# Patient Record
Sex: FEMALE | URBAN - METROPOLITAN AREA
[De-identification: names, ages, dates, MRNs, and addresses within clinical notes are randomized per-mention and may not be internally consistent; named-entity substitution may affect disease eponyms.]

---

## 2020-08-26 ENCOUNTER — APPOINTMENT (RX ONLY)
Dept: URBAN - METROPOLITAN AREA CLINIC 38 | Facility: CLINIC | Age: 25
Setting detail: DERMATOLOGY
End: 2020-08-26

## 2020-08-26 DIAGNOSIS — Q826 OTHER SPECIFIED ANOMALIES OF SKIN: ICD-10-CM

## 2020-08-26 DIAGNOSIS — Q819 OTHER SPECIFIED ANOMALIES OF SKIN: ICD-10-CM

## 2020-08-26 DIAGNOSIS — L70.0 ACNE VULGARIS: ICD-10-CM

## 2020-08-26 DIAGNOSIS — Q828 OTHER SPECIFIED ANOMALIES OF SKIN: ICD-10-CM

## 2020-08-26 DIAGNOSIS — L82.0 INFLAMED SEBORRHEIC KERATOSIS: ICD-10-CM

## 2020-08-26 PROBLEM — L85.8 OTHER SPECIFIED EPIDERMAL THICKENING: Status: ACTIVE | Noted: 2020-08-26

## 2020-08-26 PROCEDURE — ? LIQUID NITROGEN

## 2020-08-26 PROCEDURE — 17110 DESTRUCTION B9 LES UP TO 14: CPT | Mod: 52

## 2020-08-26 PROCEDURE — 99203 OFFICE O/P NEW LOW 30 MIN: CPT | Mod: 25

## 2020-08-26 PROCEDURE — ? COUNSELING

## 2020-08-26 ASSESSMENT — LOCATION ZONE DERM
LOCATION ZONE: FACE
LOCATION ZONE: TRUNK
LOCATION ZONE: TRUNK

## 2020-08-26 ASSESSMENT — LOCATION DETAILED DESCRIPTION DERM
LOCATION DETAILED: LEFT CENTRAL MALAR CHEEK
LOCATION DETAILED: RIGHT INFERIOR MEDIAL MIDBACK
LOCATION DETAILED: SUPERIOR LUMBAR SPINE
LOCATION DETAILED: LEFT BUTTOCK

## 2020-08-26 ASSESSMENT — LOCATION SIMPLE DESCRIPTION DERM
LOCATION SIMPLE: LEFT BUTTOCK
LOCATION SIMPLE: RIGHT LOWER BACK
LOCATION SIMPLE: LOWER BACK
LOCATION SIMPLE: LEFT CHEEK

## 2020-08-26 NOTE — PROCEDURE: LIQUID NITROGEN
Add 52 Modifier (Optional): yes
Render Note In Bullet Format When Appropriate: No
Detail Level: Detailed
Medical Necessity Information: It is in your best interest to select a reason for this procedure from the list below. All of these items fulfill various CMS LCD requirements except the new and changing color options.
Consent: The patient's consent was obtained including but not limited to risks of crusting, scabbing, blistering, scarring, darker or lighter pigmentary change, recurrence, incomplete removal and infection.
Post-Care Instructions: I reviewed with the patient in detail post-care instructions. Patient is to wear sunprotection, and avoid picking at any of the treated lesions. Pt may apply Vaseline to crusted or scabbing areas.
Medical Necessity Clause: This procedure was medically necessary because the lesions that were treated were:

## 2020-08-26 NOTE — PROCEDURE: COUNSELING
Detail Level: Detailed
Detail Level: Simple
Sarecycline Counseling: Patient advised regarding possible photosensitivity and discoloration of the teeth, skin, lips, tongue and gums.  Patient instructed to avoid sunlight, if possible.  When exposed to sunlight, patients should wear protective clothing, sunglasses, and sunscreen.  The patient was instructed to call the office immediately if the following severe adverse effects occur:  hearing changes, easy bruising/bleeding, severe headache, or vision changes.  The patient verbalized understanding of the proper use and possible adverse effects of sarecycline.  All of the patient's questions and concerns were addressed.
Topical Clindamycin Pregnancy And Lactation Text: This medication is Pregnancy Category B and is considered safe during pregnancy. It is unknown if it is excreted in breast milk.
Birth Control Pills Pregnancy And Lactation Text: This medication should be avoided if pregnant and for the first 30 days post-partum.
Doxycycline Pregnancy And Lactation Text: This medication is Pregnancy Category D and not consider safe during pregnancy. It is also excreted in breast milk but is considered safe for shorter treatment courses.
Tetracycline Counseling: Patient counseled regarding possible photosensitivity and increased risk for sunburn.  Patient instructed to avoid sunlight, if possible.  When exposed to sunlight, patients should wear protective clothing, sunglasses, and sunscreen.  The patient was instructed to call the office immediately if the following severe adverse effects occur:  hearing changes, easy bruising/bleeding, severe headache, or vision changes.  The patient verbalized understanding of the proper use and possible adverse effects of tetracycline.  All of the patient's questions and concerns were addressed. Patient understands to avoid pregnancy while on therapy due to potential birth defects.
Erythromycin Counseling:  I discussed with the patient the risks of erythromycin including but not limited to GI upset, allergic reaction, drug rash, diarrhea, increase in liver enzymes, and yeast infections.
Tetracycline Pregnancy And Lactation Text: This medication is Pregnancy Category D and not consider safe during pregnancy. It is also excreted in breast milk.
Topical Retinoid Pregnancy And Lactation Text: This medication is Pregnancy Category C. It is unknown if this medication is excreted in breast milk.
High Dose Vitamin A Counseling: Side effects reviewed, pt to contact office should one occur.
Azithromycin Pregnancy And Lactation Text: This medication is considered safe during pregnancy and is also secreted in breast milk.
High Dose Vitamin A Pregnancy And Lactation Text: High dose vitamin A therapy is contraindicated during pregnancy and breast feeding.
Tazorac Counseling:  Patient advised that medication is irritating and drying.  Patient may need to apply sparingly and wash off after an hour before eventually leaving it on overnight.  The patient verbalized understanding of the proper use and possible adverse effects of tazorac.  All of the patient's questions and concerns were addressed.
Topical Sulfur Applications Counseling: Topical Sulfur Counseling: Patient counseled that this medication may cause skin irritation or allergic reactions.  In the event of skin irritation, the patient was advised to reduce the amount of the drug applied or use it less frequently.   The patient verbalized understanding of the proper use and possible adverse effects of topical sulfur application.  All of the patient's questions and concerns were addressed.
Dapsone Counseling: I discussed with the patient the risks of dapsone including but not limited to hemolytic anemia, agranulocytosis, rashes, methemoglobinemia, kidney failure, peripheral neuropathy, headaches, GI upset, and liver toxicity.  Patients who start dapsone require monitoring including baseline LFTs and weekly CBCs for the first month, then every month thereafter.  The patient verbalized understanding of the proper use and possible adverse effects of dapsone.  All of the patient's questions and concerns were addressed.
Erythromycin Pregnancy And Lactation Text: This medication is Pregnancy Category B and is considered safe during pregnancy. It is also excreted in breast milk.
Topical Sulfur Applications Pregnancy And Lactation Text: This medication is Pregnancy Category C and has an unknown safety profile during pregnancy. It is unknown if this topical medication is excreted in breast milk.
Bactrim Counseling:  I discussed with the patient the risks of sulfa antibiotics including but not limited to GI upset, allergic reaction, drug rash, diarrhea, dizziness, photosensitivity, and yeast infections.  Rarely, more serious reactions can occur including but not limited to aplastic anemia, agranulocytosis, methemoglobinemia, blood dyscrasias, liver or kidney failure, lung infiltrates or desquamative/blistering drug rashes.
Benzoyl Peroxide Counseling: Patient counseled that medicine may cause skin irritation and bleach clothing.  In the event of skin irritation, the patient was advised to reduce the amount of the drug applied or use it less frequently.   The patient verbalized understanding of the proper use and possible adverse effects of benzoyl peroxide.  All of the patient's questions and concerns were addressed.
Bactrim Pregnancy And Lactation Text: This medication is Pregnancy Category D and is known to cause fetal risk.  It is also excreted in breast milk.
Minocycline Counseling: Patient advised regarding possible photosensitivity and discoloration of the teeth, skin, lips, tongue and gums.  Patient instructed to avoid sunlight, if possible.  When exposed to sunlight, patients should wear protective clothing, sunglasses, and sunscreen.  The patient was instructed to call the office immediately if the following severe adverse effects occur:  hearing changes, easy bruising/bleeding, severe headache, or vision changes.  The patient verbalized understanding of the proper use and possible adverse effects of minocycline.  All of the patient's questions and concerns were addressed.
Include Pregnancy/Lactation Warning?: No
Dapsone Pregnancy And Lactation Text: This medication is Pregnancy Category C and is not considered safe during pregnancy or breast feeding.
Spironolactone Counseling: Patient advised regarding risks of diarrhea, abdominal pain, hyperkalemia, birth defects (for female patients), liver toxicity and renal toxicity. The patient may need blood work to monitor liver and kidney function and potassium levels while on therapy. The patient verbalized understanding of the proper use and possible adverse effects of spironolactone.  All of the patient's questions and concerns were addressed.
Doxycycline Counseling:  Patient counseled regarding possible photosensitivity and increased risk for sunburn.  Patient instructed to avoid sunlight, if possible.  When exposed to sunlight, patients should wear protective clothing, sunglasses, and sunscreen.  The patient was instructed to call the office immediately if the following severe adverse effects occur:  hearing changes, easy bruising/bleeding, severe headache, or vision changes.  The patient verbalized understanding of the proper use and possible adverse effects of doxycycline.  All of the patient's questions and concerns were addressed.
Benzoyl Peroxide Pregnancy And Lactation Text: This medication is Pregnancy Category C. It is unknown if benzoyl peroxide is excreted in breast milk.
Isotretinoin Counseling: Patient should get monthly blood tests, not donate blood, not drive at night if vision affected, not share medication, and not undergo elective surgery for 6 months after tx completed. Side effects reviewed, pt to contact office should one occur.
Tazorac Pregnancy And Lactation Text: This medication is not safe during pregnancy. It is unknown if this medication is excreted in breast milk.
Topical Clindamycin Counseling: Patient counseled that this medication may cause skin irritation or allergic reactions.  In the event of skin irritation, the patient was advised to reduce the amount of the drug applied or use it less frequently.   The patient verbalized understanding of the proper use and possible adverse effects of clindamycin.  All of the patient's questions and concerns were addressed.
Azithromycin Counseling:  I discussed with the patient the risks of azithromycin including but not limited to GI upset, allergic reaction, drug rash, diarrhea, and yeast infections.
Spironolactone Pregnancy And Lactation Text: This medication can cause feminization of the male fetus and should be avoided during pregnancy. The active metabolite is also found in breast milk.
Birth Control Pills Counseling: Birth Control Pill Counseling: I discussed with the patient the potential side effects of OCPs including but not limited to increased risk of stroke, heart attack, thrombophlebitis, deep venous thrombosis, hepatic adenomas, breast changes, GI upset, headaches, and depression.  The patient verbalized understanding of the proper use and possible adverse effects of OCPs. All of the patient's questions and concerns were addressed.
Isotretinoin Pregnancy And Lactation Text: This medication is Pregnancy Category X and is considered extremely dangerous during pregnancy. It is unknown if it is excreted in breast milk.
Topical Retinoid counseling:  Patient advised to apply a pea-sized amount only at bedtime and wait 30 minutes after washing their face before applying.  If too drying, patient may add a non-comedogenic moisturizer. The patient verbalized understanding of the proper use and possible adverse effects of retinoids.  All of the patient's questions and concerns were addressed.
Detail Level: Zone

## 2020-10-26 ENCOUNTER — EH NON-PROVIDER (OUTPATIENT)
Dept: OCCUPATIONAL MEDICINE | Facility: CLINIC | Age: 25
End: 2020-10-26

## 2020-10-26 ENCOUNTER — EMPLOYEE HEALTH (OUTPATIENT)
Dept: OCCUPATIONAL MEDICINE | Facility: CLINIC | Age: 25
End: 2020-10-26

## 2020-10-26 ENCOUNTER — HOSPITAL ENCOUNTER (OUTPATIENT)
Facility: MEDICAL CENTER | Age: 25
End: 2020-10-26
Attending: PREVENTIVE MEDICINE
Payer: COMMERCIAL

## 2020-10-26 VITALS
HEART RATE: 93 BPM | HEIGHT: 69 IN | RESPIRATION RATE: 16 BRPM | DIASTOLIC BLOOD PRESSURE: 64 MMHG | SYSTOLIC BLOOD PRESSURE: 110 MMHG | WEIGHT: 139 LBS | TEMPERATURE: 98.6 F | OXYGEN SATURATION: 98 % | BODY MASS INDEX: 20.59 KG/M2

## 2020-10-26 DIAGNOSIS — Z02.1 PRE-EMPLOYMENT HEALTH SCREENING EXAMINATION: ICD-10-CM

## 2020-10-26 DIAGNOSIS — Z02.1 PRE-EMPLOYMENT DRUG SCREENING: Primary | ICD-10-CM

## 2020-10-26 DIAGNOSIS — Z02.1 PRE-EMPLOYMENT DRUG SCREENING: ICD-10-CM

## 2020-10-26 LAB
AMP AMPHETAMINE: NORMAL
BAR BARBITURATES: NORMAL
BZO BENZODIAZEPINES: NORMAL
COC COCAINE: NORMAL
INT CON NEG: NORMAL
INT CON POS: NORMAL
MDMA ECSTASY: NORMAL
MET METHAMPHETAMINES: NORMAL
MTD METHADONE: NORMAL
OPI OPIATES: NORMAL
OXY OXYCODONE: NORMAL
PCP PHENCYCLIDINE: NORMAL
POC URINE DRUG SCREEN OCDRS: NEGATIVE
THC: NORMAL

## 2020-10-26 PROCEDURE — 90686 IIV4 VACC NO PRSV 0.5 ML IM: CPT | Performed by: NURSE PRACTITIONER

## 2020-10-26 PROCEDURE — 86480 TB TEST CELL IMMUN MEASURE: CPT | Performed by: PREVENTIVE MEDICINE

## 2020-10-26 PROCEDURE — 8915 PR COMPREHENSIVE PHYSICAL: Performed by: PREVENTIVE MEDICINE

## 2020-10-26 PROCEDURE — 80305 DRUG TEST PRSMV DIR OPT OBS: CPT | Performed by: PREVENTIVE MEDICINE

## 2020-10-26 PROCEDURE — 94375 RESPIRATORY FLOW VOLUME LOOP: CPT | Performed by: PREVENTIVE MEDICINE

## 2020-10-26 PROCEDURE — 90715 TDAP VACCINE 7 YRS/> IM: CPT | Performed by: NURSE PRACTITIONER

## 2020-10-26 SDOH — HEALTH STABILITY: MENTAL HEALTH: HOW OFTEN DO YOU HAVE A DRINK CONTAINING ALCOHOL?: MONTHLY OR LESS

## 2020-10-26 ASSESSMENT — VISUAL ACUITY
OS_CC: 20/30
OD_CC: 20/25

## 2020-10-28 LAB
GAMMA INTERFERON BACKGROUND BLD IA-ACNC: 0.03 IU/ML
M TB IFN-G BLD-IMP: NEGATIVE
M TB IFN-G CD4+ BCKGRND COR BLD-ACNC: 0 IU/ML
MITOGEN IGNF BCKGRD COR BLD-ACNC: >10 IU/ML
QFT TB2 - NIL TBQ2: 0 IU/ML

## 2020-11-05 ENCOUNTER — APPOINTMENT (OUTPATIENT)
Dept: OCCUPATIONAL MEDICINE | Facility: CLINIC | Age: 25
End: 2020-11-05

## 2020-12-17 DIAGNOSIS — Z23 NEED FOR VACCINATION: ICD-10-CM

## 2020-12-29 ENCOUNTER — IMMUNIZATION (OUTPATIENT)
Dept: FAMILY PLANNING/WOMEN'S HEALTH CLINIC | Facility: IMMUNIZATION CENTER | Age: 25
End: 2020-12-29
Attending: FAMILY MEDICINE

## 2020-12-29 DIAGNOSIS — Z23 ENCOUNTER FOR VACCINATION: Primary | ICD-10-CM

## 2020-12-29 DIAGNOSIS — Z23 NEED FOR VACCINATION: ICD-10-CM

## 2020-12-29 PROCEDURE — 91301 MODERNA SARS-COV-2 VACCINE: CPT

## 2020-12-29 PROCEDURE — 0011A MODERNA SARS-COV-2 VACCINE: CPT

## 2021-01-25 ENCOUNTER — HOSPITAL ENCOUNTER (OUTPATIENT)
Facility: MEDICAL CENTER | Age: 26
End: 2021-01-25
Attending: OBSTETRICS & GYNECOLOGY
Payer: COMMERCIAL

## 2021-01-25 ENCOUNTER — GYNECOLOGY VISIT (OUTPATIENT)
Dept: OBGYN | Facility: CLINIC | Age: 26
End: 2021-01-25
Payer: COMMERCIAL

## 2021-01-25 VITALS
HEIGHT: 69 IN | DIASTOLIC BLOOD PRESSURE: 75 MMHG | BODY MASS INDEX: 25.83 KG/M2 | SYSTOLIC BLOOD PRESSURE: 107 MMHG | WEIGHT: 174.4 LBS

## 2021-01-25 DIAGNOSIS — T74.21XS SEXUAL ASSAULT OF ADULT, SEQUELA: ICD-10-CM

## 2021-01-25 DIAGNOSIS — Z30.430 ENCOUNTER FOR IUD INSERTION: ICD-10-CM

## 2021-01-25 DIAGNOSIS — Z11.3 SCREENING EXAMINATION FOR STD (SEXUALLY TRANSMITTED DISEASE): ICD-10-CM

## 2021-01-25 DIAGNOSIS — Z01.419 ENCOUNTER FOR ANNUAL ROUTINE GYNECOLOGICAL EXAMINATION: ICD-10-CM

## 2021-01-25 LAB
INT CON NEG: NEGATIVE
INT CON POS: POSITIVE
POC URINE PREGNANCY TEST: NEGATIVE

## 2021-01-25 PROCEDURE — 87491 CHLMYD TRACH DNA AMP PROBE: CPT

## 2021-01-25 PROCEDURE — 88175 CYTOPATH C/V AUTO FLUID REDO: CPT

## 2021-01-25 PROCEDURE — 58300 INSERT INTRAUTERINE DEVICE: CPT | Performed by: OBSTETRICS & GYNECOLOGY

## 2021-01-25 PROCEDURE — 81025 URINE PREGNANCY TEST: CPT | Performed by: OBSTETRICS & GYNECOLOGY

## 2021-01-25 PROCEDURE — 99395 PREV VISIT EST AGE 18-39: CPT | Mod: 25 | Performed by: OBSTETRICS & GYNECOLOGY

## 2021-01-25 PROCEDURE — 87591 N.GONORRHOEAE DNA AMP PROB: CPT

## 2021-01-25 PROCEDURE — 87624 HPV HI-RISK TYP POOLED RSLT: CPT
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
## 2021-01-25 NOTE — PROCEDURES
IUD Insertion    Date/Time: 1/25/2021 3:28 PM  Performed by: Trini Littlejohn D.O.  Authorized by: Trini Littlejohn D.O.     Consent:     Consent obtained:  Verbal and written    Consent given by:  Patient    Procedure risks and benefits discussed: yes      Patient questions answered: yes      Patient agrees, verbalizes understanding, and wants to proceed: yes    Pre-procedure details:     Negative urine pregnancy test: yes    Procedure:     Pelvic exam performed: yes      Sterile speculum placed in vagina: yes      Cervix visualized: yes      Cervix cleaned and prepped in sterile fashion: yes      Tenaculum applied to cervix: yes      Dilation needed: no      Uterus sounded: yes      Uterus sound depth (cm):  7    IUD inserted with no complications: yes      IUD type:  Omayra    Strings trimmed: yes    Post-procedure:     Patient tolerated procedure well: yes      Patient will follow up after next period: yes

## 2021-01-25 NOTE — NON-PROVIDER
Pt here as New Pt for Gyn exam  Good Phone #:[phone]  Pharmacy verified.  Last Pap:2019, abnormal cells result but normal per Pt.  LMP:1/20/2021  Pt states would like to establish care.  Pt states would like to have a well woman exam. Pt states would like to discuss getting IUD if possible today.  UPT negative, done in clinic.

## 2021-01-26 DIAGNOSIS — Z01.419 ENCOUNTER FOR ANNUAL ROUTINE GYNECOLOGICAL EXAMINATION: ICD-10-CM

## 2021-01-27 LAB
C TRACH DNA GENITAL QL NAA+PROBE: NEGATIVE
CYTOLOGY REG CYTOL: NORMAL
HPV HR 12 DNA CVX QL NAA+PROBE: NEGATIVE
HPV16 DNA SPEC QL NAA+PROBE: NEGATIVE
HPV18 DNA SPEC QL NAA+PROBE: NEGATIVE
N GONORRHOEA DNA GENITAL QL NAA+PROBE: NEGATIVE
SPECIMEN SOURCE: NORMAL
SPECIMEN SOURCE: NORMAL

## 2021-01-30 PROCEDURE — 91301 MODERNA SARS-COV-2 VACCINE: CPT

## 2021-01-30 PROCEDURE — 0012A MODERNA SARS-COV-2 VACCINE: CPT

## 2021-01-31 ENCOUNTER — IMMUNIZATION (OUTPATIENT)
Dept: FAMILY PLANNING/WOMEN'S HEALTH CLINIC | Facility: IMMUNIZATION CENTER | Age: 26
End: 2021-01-31
Payer: COMMERCIAL

## 2021-01-31 DIAGNOSIS — Z23 ENCOUNTER FOR VACCINATION: Primary | ICD-10-CM

## 2021-02-25 ENCOUNTER — GYNECOLOGY VISIT (OUTPATIENT)
Dept: OBGYN | Facility: CLINIC | Age: 26
End: 2021-02-25
Payer: COMMERCIAL

## 2021-02-25 VITALS — BODY MASS INDEX: 26.88 KG/M2 | SYSTOLIC BLOOD PRESSURE: 162 MMHG | DIASTOLIC BLOOD PRESSURE: 88 MMHG | WEIGHT: 182 LBS

## 2021-02-25 DIAGNOSIS — T83.32XA INTRAUTERINE CONTRACEPTIVE DEVICE THREADS LOST, INITIAL ENCOUNTER: ICD-10-CM

## 2021-02-25 DIAGNOSIS — Z30.431 IUD CHECK UP: ICD-10-CM

## 2021-02-25 PROCEDURE — 76830 TRANSVAGINAL US NON-OB: CPT | Performed by: OBSTETRICS & GYNECOLOGY

## 2021-02-25 PROCEDURE — 99213 OFFICE O/P EST LOW 20 MIN: CPT | Mod: 25 | Performed by: OBSTETRICS & GYNECOLOGY

## 2021-02-25 NOTE — NON-PROVIDER
Pt is here for IUD string check  Good phone #:[phone]  Pharmacy verified.  Pt states no other complaints for today.

## 2021-02-25 NOTE — PROGRESS NOTES
S: This is a 25 y.o. year old  who is s/p Omayra placement on 21.She has had some spotting and cramping since the insertion, but currently has no complaints. Denies pelvic pain, heavy vaginal bleeding, or abnormal vaginal discharge.     O: BP (!) 162/88 (BP Location: Left arm, Patient Position: Sitting, BP Cuff Size: Adult)   Wt 82.6 kg (182 lb)     GENERAL: Alert, in no apparent distress  PSYCHIATRIC: Appropriate affect, intact insight and judgement.  ABDOMEN: Soft, nontender, nondistended.  No palpable masses.  No rebound or guarding.  No inguinal lymphadenopathy.  No hepatosplenomegaly.  No hernias.    GENITOURINARY:  Normal external genitalia, no lesions.  Normal urethral meatus, no masses or tenderness.  Normal bladder without fullness or masses.  Vagina well estrogenized, no vaginal discharge or lesions.  Cervix without lesions or discharge, nontender. IUD string NOT present at cervical os.   Uterus normal size, shape, and contour, nontender.  Adnexa nontender, no masses.  Normal anus and perineum.    Rectal Exam - not indicated.    TVUS: Performed and interpreted by myself  Hypoechoic linear structure noted within midline of the uterus consistent with correct placement of IUD.  Impression: IUD within uterus    ASSESSMENT: s/p Omayra  IUD Placement, in proper position.  No side effects.    Discussed that even though her strings are not visible, it is still a viable form of contraception.  We discussed rate of failure is approximately 1%.  We discussed signs of IUD falling into the lower uterine segment which would be cramping and bleeding.  She is going to follow-up in 1 year for IUD checkup.  We can perform another ultrasound at that time to make sure it is still in the same spot.  Discussed that when it is time to have the IUD removed we might have to use a different type of clamp to have it removed that might be a little bit more uncomfortable.  Patient expresses understanding.    PLAN: F/U PRN

## 2021-03-13 ENCOUNTER — EH NON-PROVIDER (OUTPATIENT)
Dept: OCCUPATIONAL MEDICINE | Facility: CLINIC | Age: 26
End: 2021-03-13

## 2021-03-13 ENCOUNTER — HOSPITAL ENCOUNTER (OUTPATIENT)
Dept: LAB | Facility: MEDICAL CENTER | Age: 26
End: 2021-03-13
Attending: OBSTETRICS & GYNECOLOGY
Payer: COMMERCIAL

## 2021-03-13 DIAGNOSIS — T74.21XS SEXUAL ASSAULT OF ADULT, SEQUELA: ICD-10-CM

## 2021-03-13 DIAGNOSIS — Z02.89 ENCOUNTER FOR OCCUPATIONAL HEALTH EXAMINATION INVOLVING RESPIRATOR: Primary | ICD-10-CM

## 2021-03-13 DIAGNOSIS — Z11.3 SCREENING EXAMINATION FOR STD (SEXUALLY TRANSMITTED DISEASE): ICD-10-CM

## 2021-03-13 LAB
HAV IGM SERPL QL IA: NORMAL
HBV CORE IGM SER QL: NORMAL
HBV SURFACE AG SER QL: NORMAL
HCV AB SER QL: NORMAL
HIV 1+2 AB+HIV1 P24 AG SERPL QL IA: NORMAL
TREPONEMA PALLIDUM IGG+IGM AB [PRESENCE] IN SERUM OR PLASMA BY IMMUNOASSAY: NORMAL

## 2021-03-13 PROCEDURE — 86780 TREPONEMA PALLIDUM: CPT

## 2021-03-13 PROCEDURE — 36415 COLL VENOUS BLD VENIPUNCTURE: CPT

## 2021-03-13 PROCEDURE — 87389 HIV-1 AG W/HIV-1&-2 AB AG IA: CPT

## 2021-03-13 PROCEDURE — 94375 RESPIRATORY FLOW VOLUME LOOP: CPT | Performed by: PHYSICIAN ASSISTANT

## 2021-03-13 PROCEDURE — 80074 ACUTE HEPATITIS PANEL: CPT

## 2021-03-27 ENCOUNTER — OFFICE VISIT (OUTPATIENT)
Dept: URGENT CARE | Facility: PHYSICIAN GROUP | Age: 26
End: 2021-03-27
Payer: COMMERCIAL

## 2021-03-27 ENCOUNTER — APPOINTMENT (OUTPATIENT)
Dept: RADIOLOGY | Facility: IMAGING CENTER | Age: 26
End: 2021-03-27
Attending: PHYSICIAN ASSISTANT
Payer: COMMERCIAL

## 2021-03-27 VITALS
OXYGEN SATURATION: 97 % | TEMPERATURE: 98.1 F | SYSTOLIC BLOOD PRESSURE: 108 MMHG | WEIGHT: 170 LBS | RESPIRATION RATE: 18 BRPM | HEART RATE: 90 BPM | HEIGHT: 69 IN | BODY MASS INDEX: 25.18 KG/M2 | DIASTOLIC BLOOD PRESSURE: 68 MMHG

## 2021-03-27 DIAGNOSIS — S49.91XA INJURY OF RIGHT SHOULDER, INITIAL ENCOUNTER: ICD-10-CM

## 2021-03-27 DIAGNOSIS — S69.92XA HAND INJURY, LEFT, INITIAL ENCOUNTER: ICD-10-CM

## 2021-03-27 DIAGNOSIS — S62.311A DISPLACED FRACTURE OF BASE OF SECOND METACARPAL BONE, LEFT HAND, INITIAL ENCOUNTER FOR CLOSED FRACTURE: ICD-10-CM
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
## 2021-03-27 PROBLEM — F98.8 ATTENTION DEFICIT DISORDER (ADD) WITHOUT HYPERACTIVITY: Status: ACTIVE | Noted: 2017-10-18

## 2021-03-27 PROBLEM — F41.9 ANXIETY: Status: ACTIVE | Noted: 2017-10-18

## 2021-03-27 PROBLEM — N39.3 SUI (STRESS URINARY INCONTINENCE, FEMALE): Status: ACTIVE | Noted: 2017-06-16

## 2021-03-27 PROBLEM — F32.9 DEPRESSION, UNIPOLAR: Status: ACTIVE | Noted: 2017-10-18

## 2021-03-27 PROCEDURE — 73030 X-RAY EXAM OF SHOULDER: CPT | Mod: TC,RT | Performed by: PHYSICIAN ASSISTANT

## 2021-03-27 PROCEDURE — 99215 OFFICE O/P EST HI 40 MIN: CPT | Performed by: PHYSICIAN ASSISTANT

## 2021-03-27 PROCEDURE — 73130 X-RAY EXAM OF HAND: CPT | Mod: TC,LT | Performed by: PHYSICIAN ASSISTANT

## 2021-03-27 RX ORDER — KETOROLAC TROMETHAMINE 30 MG/ML
30 INJECTION, SOLUTION INTRAMUSCULAR; INTRAVENOUS ONCE
Status: COMPLETED | OUTPATIENT
Start: 2021-03-27 | End: 2021-03-27

## 2021-03-27 RX ADMIN — KETOROLAC TROMETHAMINE 30 MG: 30 INJECTION, SOLUTION INTRAMUSCULAR; INTRAVENOUS at 10:12

## 2021-03-27 ASSESSMENT — ENCOUNTER SYMPTOMS
TREMORS: 0
DIZZINESS: 0
BACK PAIN: 0
FEVER: 0
NECK PAIN: 1
WEAKNESS: 0
MYALGIAS: 1
TINGLING: 0
LOSS OF CONSCIOUSNESS: 0
FOCAL WEAKNESS: 0
CHILLS: 0
HEADACHES: 1

## 2021-03-27 NOTE — PROGRESS NOTES
Subjective:   Nadya Longoria is a 25 y.o. female who presents for Injury (hand injury left, right shoulder. ski injury. happened thursday. )      HPI  25 y.o. female presents to urgent care with new problem to provider of injury to left hand and right shoulder secondary to skiing accident that occurred 3 days ago. She states she got going to fast and crashed in the moguls secondary to poor visibility. She reports mild muscular neck pain, sternum pain that increases with deep breath, and pain with ROM of right shoulder. Denies midline neck or back pain.   No LOC, patient was wearing a helmet. She recalls event, but states she had difficultly recalling events directly after fall. Denies difficulty ambulation and she was able to ski away from the scene. Patient is right hand dominant.   Denies other associated aggravating or alleviating factors.     Review of Systems   Constitutional: Negative for chills, fever and malaise/fatigue.   Musculoskeletal: Positive for myalgias and neck pain. Negative for back pain.        Right shoulder and left hand pain   Neurological: Positive for headaches. Negative for dizziness, tingling, tremors, focal weakness, loss of consciousness and weakness.   All other systems reviewed and are negative.      Patient Active Problem List   Diagnosis   • Anxiety   • Attention deficit disorder (ADD) without hyperactivity   • Depression, unipolar (HCC)   • ISAIAH (stress urinary incontinence, female)     History reviewed. No pertinent surgical history.  Social History     Tobacco Use   • Smoking status: Never Smoker   • Smokeless tobacco: Never Used   Substance Use Topics   • Alcohol use: Yes   • Drug use: Never      History reviewed. No pertinent family history.   (Allergies, Medications, & Tobacco/Substance Use were reconciled by the Medical Assistant and reviewed by myself. The family history is prepopulated)     Objective:     /68   Pulse 90   Temp 36.7 °C (98.1 °F) (Temporal)   Resp 18    "Ht 1.753 m (5' 9\")   Wt 77.1 kg (170 lb)   SpO2 97%   BMI 25.10 kg/m²     Physical Exam  Vitals reviewed.   Constitutional:       Appearance: Normal appearance. She is well-developed.   HENT:      Head: Normocephalic and atraumatic.      Mouth/Throat:      Mouth: Mucous membranes are moist.      Pharynx: Oropharynx is clear.   Eyes:      Conjunctiva/sclera: Conjunctivae normal.   Cardiovascular:      Rate and Rhythm: Normal rate and regular rhythm.   Pulmonary:      Effort: Pulmonary effort is normal. No respiratory distress.   Abdominal:      Palpations: Abdomen is soft.   Musculoskeletal:      Right shoulder: Bony tenderness present. No swelling, deformity or crepitus. Normal range of motion.        Arms:         Hands:       Cervical back: Normal range of motion and neck supple. Muscular tenderness present. No spinous process tenderness.   Skin:     General: Skin is warm and dry.      Capillary Refill: Capillary refill takes less than 2 seconds.      Findings: Bruising present.   Neurological:      General: No focal deficit present.      Mental Status: She is alert and oriented to person, place, and time.   Psychiatric:         Mood and Affect: Mood normal.         Behavior: Behavior normal.         Thought Content: Thought content normal.         Judgment: Judgment normal.         Assessment/Plan:     1. Hand injury, left, initial encounter  DX-HAND 3+ LEFT    DX-SHOULDER 2+ RIGHT    ketorolac (TORADOL) injection 30 mg   2. Injury of right shoulder, initial encounter  DX-SHOULDER 2+ RIGHT    ketorolac (TORADOL) injection 30 mg   3. Fall involving snow ski as cause of accidental injury     4. Displaced fracture of base of second metacarpal bone, left hand, initial encounter for closed fracture       Narrative & Impression        3/27/2021 9:51 AM     HISTORY/REASON FOR EXAM:  Pain/Deformity Following Trauma.     TECHNIQUE/EXAM DESCRIPTION AND NUMBER OF VIEWS:  3 views of the LEFT hand.     COMPARISON: " None     FINDINGS:     BONE MINERALIZATION: Normal.  JOINTS: Preserved. No erosions.  FRACTURE: Acute, mildly displaced fracture of the second metacarpal base. Possible intra-articular extension.  DISLOCATION: None.  SOFT TISSUES: No mass.     IMPRESSION:  Acute, mildly displaced fracture of the second metacarpal base. Possible intra-articular extension.        Narrative & Impression        3/27/2021 9:51 AM     HISTORY/REASON FOR EXAM:  Pain/Deformity Following Trauma.     TECHNIQUE/EXAM DESCRIPTION AND NUMBER OF VIEWS:  3 views of the RIGHT shoulder.     COMPARISON: None     FINDINGS:     BONE MINERALIZATION: Normal.  JOINTS: Preserved. No erosions.  FRACTURE: None.  DISLOCATION: None.  SOFT TISSUES: No mass.     IMPRESSION:     No acute osseous abnormality.       Given 30 mg of Toradol IM in clinic with good pain relief.  She was given an urgent referral to follow-up with hand surgery for further evaluation of mildly displaced fracture of the second metacarpal base. She was placed in jigar type splint to prevent extension of metacarpals. Continue with OTC NSAIDs in 24 hours. She may also alternate tylenol. Ice, rest, and elevate.      Differential diagnosis, natural history, supportive care, and indications for immediate follow-up discussed.    Advised the patient to follow-up with the primary care physician for recheck, reevaluation, and consideration of further management.  Patient verbalized understanding of treatment plan and has no further questions regarding care.     Please note that this dictation was created using voice recognition software. I have made a reasonable attempt to correct obvious errors, but I expect that there are errors of grammar and possibly content that I did not discover before finalizing the note.    This note was electronically signed by Val Ac PA-C

## 2021-03-27 NOTE — LETTER
March 27, 2021         Patient: Nadya Longoria   YOB: 1995   Date of Visit: 3/27/2021           To Whom it May Concern:    Nadya Longoria was seen in my clinic on 3/27/2021. She {Return to school/sport/work:74362}    If you have any questions or concerns, please don't hesitate to call.        Sincerely,           Val Ac P.A.-C.  Electronically Signed

## 2021-09-21 ENCOUNTER — IMMUNIZATION (OUTPATIENT)
Dept: OCCUPATIONAL MEDICINE | Facility: CLINIC | Age: 26
End: 2021-09-21

## 2021-09-21 DIAGNOSIS — Z23 NEED FOR VACCINATION: Primary | ICD-10-CM

## 2021-09-21 PROCEDURE — 90686 IIV4 VACC NO PRSV 0.5 ML IM: CPT | Performed by: PREVENTIVE MEDICINE

## 2021-11-23 ENCOUNTER — HOSPITAL ENCOUNTER (OUTPATIENT)
Facility: MEDICAL CENTER | Age: 26
End: 2021-11-23
Attending: OBSTETRICS & GYNECOLOGY
Payer: COMMERCIAL

## 2021-11-23 ENCOUNTER — GYNECOLOGY VISIT (OUTPATIENT)
Dept: OBGYN | Facility: CLINIC | Age: 26
End: 2021-11-23
Payer: COMMERCIAL

## 2021-11-23 VITALS
DIASTOLIC BLOOD PRESSURE: 62 MMHG | BODY MASS INDEX: 27.55 KG/M2 | HEIGHT: 69 IN | WEIGHT: 186 LBS | SYSTOLIC BLOOD PRESSURE: 115 MMHG

## 2021-11-23 DIAGNOSIS — N89.8 VAGINAL ITCHING: ICD-10-CM

## 2021-11-23 DIAGNOSIS — Z11.3 SCREEN FOR STD (SEXUALLY TRANSMITTED DISEASE): Primary | ICD-10-CM

## 2021-11-23 LAB
AMBIGUOUS DTTM AMBI4: NORMAL
CANDIDA DNA VAG QL PROBE+SIG AMP: NEGATIVE
G VAGINALIS DNA VAG QL PROBE+SIG AMP: NEGATIVE
T VAGINALIS DNA VAG QL PROBE+SIG AMP: NEGATIVE

## 2021-11-23 PROCEDURE — 87591 N.GONORRHOEAE DNA AMP PROB: CPT

## 2021-11-23 PROCEDURE — 87510 GARDNER VAG DNA DIR PROBE: CPT

## 2021-11-23 PROCEDURE — 99213 OFFICE O/P EST LOW 20 MIN: CPT | Performed by: OBSTETRICS & GYNECOLOGY

## 2021-11-23 PROCEDURE — 87491 CHLMYD TRACH DNA AMP PROBE: CPT

## 2021-11-23 PROCEDURE — 87660 TRICHOMONAS VAGIN DIR PROBE: CPT

## 2021-11-23 PROCEDURE — 87480 CANDIDA DNA DIR PROBE: CPT

## 2021-11-23 NOTE — PROGRESS NOTES
"GYN PROBLEM VISIT    CC:  Vaginal Itching (STD testing )       HPI: Patient is a 26 y.o. who complains of intermittent and frequent/recurrent vaginal itching.  She reports odor as well.  She states sometimes it will \"self resolve\" as she made this appt a month ago when itching and then it got better but has started up again the past 3 days.  She denies significant discharge.  She would also like to get STD testing today.  She is sexually active and uses condoms \"sometimes.\"       ROS:   General: denies fever / chills  HEENT: denies sore throat:  CV: denies chest pain:  Repiratory: denies shortness of breath  GI: denies abdominal pain  : denies dysuria:    PFSH:  I personally reviewed the past medical and surgical histories.     Social History     Tobacco Use   • Smoking status: Never Smoker   • Smokeless tobacco: Never Used   Vaping Use   • Vaping Use: Never used   Substance Use Topics   • Alcohol use: Yes   • Drug use: Never       Social History     Substance and Sexual Activity   Sexual Activity Not Currently        ALLERGIES / REACTIONS:  No Known Allergies                        PHYSICAL EXAMINATION:  Vital Signs:   Vitals:    11/23/21 0817   BP: 115/62   BP Location: Right arm   Patient Position: Sitting   BP Cuff Size: Adult   Weight: 84.4 kg (186 lb)   Height: 1.753 m (5' 9\")     Body mass index is 27.47 kg/m².    Gen: appears well, NAD  Respiratory: normal effort  Abdomen: Soft, non-tender.  Pelvic Exam:    Vulva: normal.    Urethra: normal.   Vagina: normal.  No notable discharge or odor. Affirm and GC/CT collected   Cervix: normal. IUD string visualized extending about 2mm from os    ASSESSMENT AND PLAN:  Nadya was seen today for vaginal itching.    Diagnoses and all orders for this visit:    Screen for STD (sexually transmitted disease)  -     T.PALLIDUM AB EIA; Future  -     HIV AG/AB COMBO ASSAY SCREENING; Future  -     HEPATITIS PANEL ACUTE(4 COMPONENTS); Future    Vaginal itching  -     " Chlamydia/GC PCR Urine Or Swab  -     VAGINAL PATHOGENS DNA PANEL        Yanira Jaquez D.O.

## 2021-11-24 LAB
C TRACH DNA SPEC QL NAA+PROBE: NEGATIVE
N GONORRHOEA DNA SPEC QL NAA+PROBE: NEGATIVE
SPECIMEN SOURCE: NORMAL

## 2022-03-10 ENCOUNTER — OCCUPATIONAL MEDICINE (OUTPATIENT)
Dept: OCCUPATIONAL MEDICINE | Facility: CLINIC | Age: 27
End: 2022-03-10
Payer: COMMERCIAL

## 2022-03-10 ENCOUNTER — NON-PROVIDER VISIT (OUTPATIENT)
Dept: OCCUPATIONAL MEDICINE | Facility: CLINIC | Age: 27
End: 2022-03-10
Payer: COMMERCIAL

## 2022-03-10 ENCOUNTER — HOSPITAL ENCOUNTER (EMERGENCY)
Facility: MEDICAL CENTER | Age: 27
End: 2022-03-10
Attending: EMERGENCY MEDICINE
Payer: COMMERCIAL

## 2022-03-10 VITALS
HEIGHT: 69 IN | HEART RATE: 87 BPM | DIASTOLIC BLOOD PRESSURE: 78 MMHG | TEMPERATURE: 97 F | WEIGHT: 180 LBS | OXYGEN SATURATION: 97 % | RESPIRATION RATE: 15 BRPM | SYSTOLIC BLOOD PRESSURE: 125 MMHG | BODY MASS INDEX: 26.66 KG/M2

## 2022-03-10 VITALS
HEIGHT: 69 IN | WEIGHT: 180 LBS | TEMPERATURE: 98.6 F | OXYGEN SATURATION: 98 % | HEART RATE: 80 BPM | SYSTOLIC BLOOD PRESSURE: 104 MMHG | DIASTOLIC BLOOD PRESSURE: 66 MMHG | BODY MASS INDEX: 26.66 KG/M2
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
## 2022-03-10 DIAGNOSIS — Z77.21 EXPOSURE TO BLOOD OR BODY FLUID: Primary | ICD-10-CM

## 2022-03-10 DIAGNOSIS — Z02.89 VISIT FOR OCCUPATIONAL HEALTH EXAMINATION: Primary | ICD-10-CM

## 2022-03-10 DIAGNOSIS — S61.231A PUNCTURE WOUND OF LEFT INDEX FINGER: ICD-10-CM

## 2022-03-10 LAB
ALBUMIN SERPL BCP-MCNC: 4.6 G/DL (ref 3.2–4.9)
ALBUMIN/GLOB SERPL: 1.7 G/DL
ALP SERPL-CCNC: 85 U/L (ref 30–99)
ALT SERPL-CCNC: 16 U/L (ref 2–50)
AMP AMPHETAMINE: NORMAL
ANION GAP SERPL CALC-SCNC: 12 MMOL/L (ref 7–16)
AST SERPL-CCNC: 22 U/L (ref 12–45)
BAR BARBITURATES: NORMAL
BILIRUB SERPL-MCNC: 0.3 MG/DL (ref 0.1–1.5)
BREATH ALCOHOL COMMENT: NORMAL
BUN SERPL-MCNC: 13 MG/DL (ref 8–22)
BZO BENZODIAZEPINES: NORMAL
CALCIUM SERPL-MCNC: 9.4 MG/DL (ref 8.5–10.5)
CHLORIDE SERPL-SCNC: 101 MMOL/L (ref 96–112)
CO2 SERPL-SCNC: 24 MMOL/L (ref 20–33)
COC COCAINE: NORMAL
CREAT SERPL-MCNC: 0.53 MG/DL (ref 0.5–1.4)
ERYTHROCYTE [DISTWIDTH] IN BLOOD BY AUTOMATED COUNT: 42.5 FL (ref 35.9–50)
GLOBULIN SER CALC-MCNC: 2.7 G/DL (ref 1.9–3.5)
GLUCOSE SERPL-MCNC: 85 MG/DL (ref 65–99)
HBV CORE AB SERPL QL IA: NONREACTIVE
HBV SURFACE AB SERPL IA-ACNC: <3.5 MIU/ML (ref 0–10)
HBV SURFACE AG SER QL: ABNORMAL
HCG SERPL QL: NEGATIVE
HCT VFR BLD AUTO: 39 % (ref 37–47)
HCV AB SER QL: ABNORMAL
HGB BLD-MCNC: 12.9 G/DL (ref 12–16)
HIV 1+2 AB+HIV1 P24 AG SERPL QL IA: ABNORMAL
INT CON NEG: NORMAL
INT CON POS: NORMAL
MCH RBC QN AUTO: 30.9 PG (ref 27–33)
MCHC RBC AUTO-ENTMCNC: 33.1 G/DL (ref 33.6–35)
MCV RBC AUTO: 93.5 FL (ref 81.4–97.8)
MDMA ECSTASY: NORMAL
MET METHAMPHETAMINES: NORMAL
MTD METHADONE: NORMAL
OPI OPIATES: NORMAL
OXY OXYCODONE: NORMAL
PCP PHENCYCLIDINE: NORMAL
PLATELET # BLD AUTO: 235 K/UL (ref 164–446)
PMV BLD AUTO: 9.5 FL (ref 9–12.9)
POC BREATHALIZER: 0 PERCENT (ref 0–0.01)
POC URINE DRUG SCREEN OCDRS: NORMAL
POTASSIUM SERPL-SCNC: 3.4 MMOL/L (ref 3.6–5.5)
PROT SERPL-MCNC: 7.3 G/DL (ref 6–8.2)
RBC # BLD AUTO: 4.17 M/UL (ref 4.2–5.4)
SODIUM SERPL-SCNC: 137 MMOL/L (ref 135–145)
THC: NORMAL
WBC # BLD AUTO: 7.6 K/UL (ref 4.8–10.8)

## 2022-03-10 PROCEDURE — 84703 CHORIONIC GONADOTROPIN ASSAY: CPT

## 2022-03-10 PROCEDURE — 86706 HEP B SURFACE ANTIBODY: CPT

## 2022-03-10 PROCEDURE — 80305 DRUG TEST PRSMV DIR OPT OBS: CPT | Performed by: NURSE PRACTITIONER

## 2022-03-10 PROCEDURE — 86803 HEPATITIS C AB TEST: CPT

## 2022-03-10 PROCEDURE — 87389 HIV-1 AG W/HIV-1&-2 AB AG IA: CPT

## 2022-03-10 PROCEDURE — 80053 COMPREHEN METABOLIC PANEL: CPT

## 2022-03-10 PROCEDURE — 90746 HEPB VACCINE 3 DOSE ADULT IM: CPT | Performed by: PREVENTIVE MEDICINE

## 2022-03-10 PROCEDURE — 99202 OFFICE O/P NEW SF 15 MIN: CPT | Mod: 25 | Performed by: PREVENTIVE MEDICINE

## 2022-03-10 PROCEDURE — 99283 EMERGENCY DEPT VISIT LOW MDM: CPT

## 2022-03-10 PROCEDURE — 85027 COMPLETE CBC AUTOMATED: CPT

## 2022-03-10 PROCEDURE — A9270 NON-COVERED ITEM OR SERVICE: HCPCS | Performed by: EMERGENCY MEDICINE

## 2022-03-10 PROCEDURE — 700102 HCHG RX REV CODE 250 W/ 637 OVERRIDE(OP): Performed by: EMERGENCY MEDICINE

## 2022-03-10 PROCEDURE — 86704 HEP B CORE ANTIBODY TOTAL: CPT

## 2022-03-10 PROCEDURE — 87340 HEPATITIS B SURFACE AG IA: CPT

## 2022-03-10 PROCEDURE — 90471 IMMUNIZATION ADMIN: CPT | Performed by: PREVENTIVE MEDICINE

## 2022-03-10 PROCEDURE — 82075 ASSAY OF BREATH ETHANOL: CPT | Performed by: NURSE PRACTITIONER

## 2022-03-10 RX ORDER — EMTRICITABINE AND TENOFOVIR DISOPROXIL FUMARATE 200; 300 MG/1; MG/1
1 TABLET, FILM COATED ORAL ONCE
Status: COMPLETED | OUTPATIENT
Start: 2022-03-10 | End: 2022-03-10

## 2022-03-10 RX ADMIN — EMTRICITABINE AND TENOFOVIR DISOPROXIL FUMARATE 1 TABLET: 200; 300 TABLET, FILM COATED ORAL at 04:00

## 2022-03-10 RX ADMIN — RALTEGRAVIR 400 MG: 400 TABLET, FILM COATED ORAL at 04:01

## 2022-03-10 ASSESSMENT — FIBROSIS 4 INDEX: FIB4 SCORE: 0.56

## 2022-03-10 NOTE — LETTER
But there is an exact   [facility] [place]  [address],   [address] [place] [address]  Phone:  [phone] - Fax:  [phone]   [facility] Progress Report and Disability Certification  Date of Service: 3/10/2022   No Show:  No  Date / Time of Next Visit:  Release from care   Claim Information   Patient Name: Nadya Longoria  Claim Number:     Employer: [employer]  Date of Injury: 3/10/2022     Insurer / TPA: [employer]  ID / SSN:     Occupation: REGISTERED NURSE  Diagnosis: The encounter diagnosis was Exposure to blood or body fluid.    Medical Information   Related to Industrial Injury? Yes    Subjective Complaints:  DOI 3/10/2022: 26-year-old injured worker presents with needlestick injury.  JHONATAN: She was attempting to insert an IV, pulled the catheter needle out and accidentally poked herself in the left index finger.  She was seen in the ER, baseline labs were drawn and HIV medications were started.  Currently asymptomatic.   Objective Findings: Constitutional: Patient is in no acute distress. Appears well-developed and well-nourished.   HENT: Normocephalic and atraumatic. EOM are normal. No scleral icterus.   Cardiovascular: Normal rate.    Pulmonary/Chest: Effort normal. No respiratory distress.   Neurological: Patient is alert and oriented to person, place, and time.   Skin: Skin is warm and dry.   Psychiatric: Normal mood and affect. Behavior is normal.      Pre-Existing Condition(s):     Assessment:   Condition Improved    Status: Discharged /  MMI  Permanent Disability:No    Plan:      Diagnostics:      Comments:  Source status negative for HIV, Hep B and Hep C  Baseline labs within normal limits. Negative for HIV, Hep B and Hep C. Titers indicate patient non-immune to Hepatitis B.  Recommend hepatitis B vaccine series provided first vaccine today.  Given nega  tive source no further follow up or testing recommended  Tetanus is up-to-date  Release  from care  Follow up as needed      Disability Information   Status: Released to Full Duty    From:  3/10/2022  Through:   Restrictions are:     Physical Restrictions   Sitting:    Standing:    Stooping:    Bending:      Squatting:    Walking:    Climbing:    Pushing:      Pulling:    Other:    Reaching Above Shoulder (L):   Reaching Above Shoulder (R):       Reaching Below Shoulder (L):    Reaching Below Shoulder (R):      Not to exceed Weight Limits   Carrying(hrs):   Weight Limit(lb):   Lifting(hrs):   Weight  Limit(lb):     Comments:      Repetitive Actions   Hands: i.e. Fine Manipulations from Grasping:     Feet: i.e. Operating Foot Controls:     Driving / Operate Machinery:     Health Care Provider’s Original or Electronic Signature  Peterson Prieto D.O. Health Care Provider’s Original or Electronic Signature    Ruben Daly MD         Clinic Name / Location: [address],   [address] [address] Clinic Phone Number: Dept: [phone]   Appointment Time: 4:15 Pm Visit Start Time: 4:28 PM   Check-In Time:  4:23 Pm Visit Discharge Time:  4:47pm   Original-Treating Physician or Chiropractor    Page 2-Insurer/TPA    Page 3-Employer    Page 4-Employee

## 2022-03-10 NOTE — ED PROVIDER NOTES
"ED Provider Note    CHIEF COMPLAINT  Chief Complaint   Patient presents with   • Body Fluid Exposure     Pt had needle stick upstairs.         HPI    Primary care provider: Pcp Pt States None   History obtained from: Patient  History limited by: None     Nadya Longoria is a 26 y.o. female who presents to the ED with an accidental needlestick injury shortly prior to arrival.  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  The source patient is currently a patient in the hospital and will have blood obtained.  Patient reports that she is up-to-date with all her immunization shots including hepatitis and tetanus.  She is able to move her finger without difficulty.  She denies possibility of pregnancy.  She would like to start HIV prophylaxis.    REVIEW OF SYSTEMS  Please see HPI for pertinent positives/negatives.     PAST MEDICAL HISTORY  Past Medical History:   Diagnosis Date   • Anxiety 10/18/2017   • Depression, unipolar (HCC) 10/18/2017        SURGICAL HISTORY  History reviewed. No pertinent surgical history.     SOCIAL HISTORY  Social History     Tobacco Use   • Smoking status: Never Smoker   • Smokeless tobacco: Never Used   Vaping Use   • Vaping Use: Never used   Substance and Sexual Activity   • Alcohol use: Yes   • Drug use: Never   • Sexual activity: Not Currently        FAMILY HISTORY  No family history on file.     CURRENT MEDICATIONS  Home Medications     Reviewed by Nadya De La Paz R.N. (Registered Nurse) on 03/10/22 at 0305  Med List Status: <None>   Medication Last Dose Status   Levonorgestrel (MELI IU)  Active                 ALLERGIES  No Known Allergies     PHYSICAL EXAM  VITAL SIGNS: /78   Pulse 87   Temp 36.1 °C (97 °F) (Temporal)   Resp 15   Ht 1.753 m (5' 9\")   Wt 81.6 kg (180 lb)   SpO2 97%   BMI 26.58 kg/m²  @PEMA[[id number]::@     Pulse ox interpretation: 97% I interpret this pulse ox as normal "     Constitutional: Well developed, well nourished, alert in no apparent distress, nontoxic appearance   HENT: No external signs of trauma, normocephalic   Eyes: No discharge, no icterus   Neck: No stridor   Cardiovascular: Strong distal pulses and good perfusion   Thorax & Lungs: No respiratory distress   Extremities: No cyanosis, puncture wound to the volar surface of proximal phalanx of left index finger, no edema, no gross deformity, full ROM, intact distal pulses with brisk cap refill   Skin: Warm, dry, no pallor/cyanosis, no rash noted except as above  Neuro: A/O times 3, no focal deficits noted   Psychiatric: Cooperative      DIAGNOSTIC STUDIES / PROCEDURES        LABS  All labs reviewed by me.     Results for orders placed or performed during the hospital encounter of 03/10/22   EXPOSED PERSON-SOURCE PT POSITIVE OR UNK (BLOOD & BODY FLUID EXPOSURE)   Result Value Ref Range    HIV Ag/Ab Combo Assay Non-Reactive Non Reactive    Hepatitis C Antibody Non-Reactive Non-Reactive    Hepatitis B Surface Antigen Non-Reactive Non-Reactive    Hep B Surface Antibody Quant <3.50 0.00 - 10.00 mIU/mL    Hepatitis B Core Ab, Total NonReactive Non-Reactive    WBC 7.6 4.8 - 10.8 K/uL    RBC 4.17 (L) 4.20 - 5.40 M/uL    Hemoglobin 12.9 12.0 - 16.0 g/dL    Hematocrit 39.0 37.0 - 47.0 %    MCV 93.5 81.4 - 97.8 fL    MCH 30.9 27.0 - 33.0 pg    MCHC 33.1 (L) 33.6 - 35.0 g/dL    RDW 42.5 35.9 - 50.0 fL    Platelet Count 235 164 - 446 K/uL    MPV 9.5 9.0 - 12.9 fL    Sodium 137 135 - 145 mmol/L    Potassium 3.4 (L) 3.6 - 5.5 mmol/L    Chloride 101 96 - 112 mmol/L    Co2 24 20 - 33 mmol/L    Anion Gap 12.0 7.0 - 16.0    Glucose 85 65 - 99 mg/dL    Bun 13 8 - 22 mg/dL    Creatinine 0.53 0.50 - 1.40 mg/dL    Calcium 9.4 8.5 - 10.5 mg/dL    AST(SGOT) 22 12 - 45 U/L    ALT(SGPT) 16 2 - 50 U/L    Alkaline Phosphatase 85 30 - 99 U/L    Total Bilirubin 0.3 0.1 - 1.5 mg/dL    Albumin 4.6 3.2 - 4.9 g/dL    Total Protein 7.3 6.0 - 8.2 g/dL     Globulin 2.7 1.9 - 3.5 g/dL    A-G Ratio 1.7 g/dL   BETA-HCG QUALITATIVE SERUM   Result Value Ref Range    Beta-Hcg Qualitative Serum Negative Negative   ESTIMATED GFR   Result Value Ref Range    GFR If African American >60 >60 mL/min/1.73 m 2    GFR If Non African American >60 >60 mL/min/1.73 m 2        RADIOLOGY  The radiologist's interpretation of all radiological studies have been reviewed by me.     No orders to display          COURSE & MEDICAL DECISION MAKING  Nursing notes, VS, PMSFHx reviewed in chart.     Differential diagnoses considered include but are not limited to: Puncture wound, contusion, potential body flu exposure      History and physical exam as above.  Patient with accidental needlestick injury to the left index finger.  No apparent significant neurovascular or tendon injury.  Patient is up-to-date with her immunizations.  Blood will be obtained from both the patient and the source patient.  Patient would like to start HIV prophylaxis which will be initiated by ED pharmacist.  Patient will follow-up with occupational health.  Patient verbalized understanding and agreed with plan of care with no further questions or concerns.      The patient is referred to a primary physician for blood pressure management, diabetic screening, and for all other preventative health concerns.       FINAL IMPRESSION  1. Needlestick injury accident with exposure to body fluid Acute   2. Puncture wound of left index finger Acute          DISPOSITION  Patient will be discharged home in stable condition.       FOLLOW UP  Mari Hebert  [address] [address]  [phone]    Call today      [facility], Emergency Dept  [address] [id number] [phone]    If symptoms worsen         OUTPATIENT MEDICATIONS  Discharge Medication List as of 3/10/2022  4:02 AM             Electronically signed by: Christ Abernathy D.O., 3/10/2022 3:17 AM      Portions of this record were made with  voice recognition software.  Despite my review, spelling/grammar/context errors may still remain.  Interpretation of this chart should be taken in this context.

## 2022-03-10 NOTE — LETTER
"  FORM C-4:  EMPLOYEE’S CLAIM FOR COMPENSATION/ REPORT OF INITIAL TREATMENT  EMPLOYEE’S CLAIM - PROVIDE ALL INFORMATION REQUESTED   First Name Nadya Last Name Swati Birthdate 1995  Sex female Claim Number   Home Address [address]   [facility] [place]             Zip [address]                                   Age  26 y.o. 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 (if applicable) ATTEMPTING TO INSERT IV    How did this injury or occupational disease occur? Be specific and answer in detail. 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 N/A Witnesses to the Accident  Liz Valente   Nature of Injury or Occupational Disease  Workers' Compensation Part(s) of Body Injured or Affected  Finger (L), N/A, N/A    I CERTIFY THAT THE ABOVE IS TRUE AND CORRECT TO THE BEST OF MY KNOWLEDGE AND THAT I HAVE PROVIDED THIS INFORMATION IN ORDER TO OBTAIN THE " B[place]EF[place] CHAPTER 617 OF [place]).  I HEREBY AUTHORIZE ANY PHYSICIAN, CHIROPRACTOR, SURGEON, PRACTITIONER, OR OTHER PERSON, ANY HOSPITAL, INCLUDING [facility] OR [facility], ANY MEDICAL SERVICE ORGANIZATION, ANY INSURANCE COMPANY, OR OTHER INSTITUTION OR ORGANIZATION TO RELEASE TO EACH OTHER, ANY MEDICAL OR OTHER INFORMATION, INCLUDING BENEFITS PAID OR PAYABLE, PERTINENT TO THIS INJURY OR DISEASE, EXCEPT INFORMATION RELATIVE TO DIAGNOSIS, TREATMENT AND/OR COUNSELING FOR AIDS, PSYCHOLOGICAL CONDITIONS, ALCOHOL OR CONTROLLED SUBSTANCES, FOR WHICH I MUST GIVE SPECIFIC AUTHORIZATION.  A PHOTOSTAT OF THIS AUTHORIZATION SHALL BE AS VALID AS THE ORIGINAL.  Date  03/10/2022  Place  [facility]       Employee’s Signature   THIS REPORT MUST BE COMPLETED AND MAILED WITHIN 3 WORKING DAYS OF TREATMENT   Place [facility], EMERGENCY DEPT                       Name of Facility [facility]   Date  3/10/2022 Diagnosis  (W46.1XXA) Needlestick injury accident with exposure to body fluid, Acute  (S61.231A) Puncture wound of left index finger, Acute Is there evidence the injured employee was under the influence of alcohol and/or another controlled substance at the time of accident?   Hour  3:49 AM Description of Injury or Disease  Needlestick injury accident with exposure to body fluid  Puncture wound of left index finger No   Treatment  Puncture wound to left index finger due to accidental needlestick  Have you advised the patient to remain off work five days or more?         No   X-Ray Findings    If Yes   From Date    To Date      From information given by the employee, together with medical evidence, can you directly connect this injury or occupational disease as job incurred? Yes If No, is employee capable of: Full Duty  Yes Modified Duty      Is additional medical  "care by a physician indicated? Yes  Comments:Please follow-up with occupational health If Modified Duty, Specify any Limitations / Restrictions       Do you know of any previous injury or disease contributing to this condition or occupational disease? No    Date 3/10/2022 Print Doctor’s Name Mey Abernathy certify the employer’s copy of this form was mailed on:   Address [address] [place] [address]  [place] [id number] [phone] INSURER’S USE ONLY   Provider’s Tax ID Number   Telephone Dept: [phone]    Doctor’s Signature e-MEY Negrete D.O. Degree  M.D.      Form C-4 (rev.10/07)                                                                         BRIEF DESCRIPTION OF RIGHTS AND BENEFITS  (Pursuant to [place]RS 616C.050)    Notice of Injury or Occupational Disease (Incident Report Form C-1): If an injury or occupational disease (OD) arises out of and in the course of employment, you must provide written notice to your employer as soon as practicable, but no later than 7 days after the accident or OD. Your employer shall maintain a sufficient supply of the required forms.    Claim for Compensation (Form C-4): If medical treatment is sought, the form C-4 is available at the place of initial treatment. A completed \"Claim for Compensation\" (Form C-4) must be filed within 90 days after an accident or OD. The treating physician or chiropractor must, within 3 working days after treatment, complete and mail to the employer, the employer's insurer and third-party , the Claim for Compensation.    Medical Treatment: If you require medical treatment for your on-the-job injury or OD, you may be required to select a physician or chiropractor from a list provided by your workers’ compensation insurer, if it has contracted with an Organization for Managed Care (MCO) or Preferred Provider Organization (PPO) or providers of health care. If your employer has not entered into a contract with an MCO or PPO, you may select a " physician or chiropractor from the Panel of Physicians and Chiropractors. Any medical costs related to your industrial injury or OD will be paid by your insurer.    Temporary Total Disability (TTD): If your doctor has certified that you are unable to work for a period of at least 5 consecutive days, or 5 cumulative days in a 20-day period, or places restrictions on you that your employer does not accommodate, you may be entitled to TTD compensation.    Temporary Partial Disability (TPD): If the wage you receive upon reemployment is less than the compensation for TTD to which you are entitled, the insurer may be required to pay you TPD compensation to make up the difference. TPD can only be paid for a maximum of 24 months.    Permanent Partial Disability (PPD): When your medical condition is stable and there is an indication of a PPD as a result of your injury or OD, within 30 days, your insurer must arrange for an evaluation by a rating physician or chiropractor to determine the degree of your PPD. The amount of your PPD award depends on the date of injury, the results of the PPD evaluation, your age and wage.    Permanent Total Disability (PTD): If you are medically certified by a treating physician or chiropractor as permanently and totally disabled and have been granted a PTD status by your insurer, you are entitled to receive monthly benefits not to exceed 66 2/3% of your average monthly wage. The amount of your PTD payments is subject to reduction if you previously received a lump-sum PPD award.    Vocational Rehabilitation Services: You may be eligible for vocational rehabilitation services if you are unable to return to the job due to a permanent physical impairment or permanent restrictions as a result of your injury or occupational disease.    Transportation and Per Kristofer Reimbursement: You may be eligible for travel expenses and per kristofer associated with medical treatment.    Reopening: You may be able to  reopen your claim if your condition worsens after claim closure.     Appeal Process: If you disagree with a written determination issued by the insurer or the insurer does not respond to your request, you may appeal to the Department of Administration, , by following the instructions contained in your determination letter. You must appeal the determination within 70 days from the date of the determination letter at [address], [address], [place], [place] [address], or [address] [facility], [address], [place], [place] [address]. If you disagree with the  decision, you may appeal to the Department of Administration, . You must file your appeal within 30 days from the date of the  decision letter at [address], [address], [place], [place] [address], or [address], [facility] [address], [place], [place] [address]. If you disagree with a decision of an , you may file a petition for judicial review with the District Court. You must do so within 30 days of the Appeal Officer’s decision. You may be represented by an  at your own expense or you may contact the [facility] for possible representation.    [place]  for Inj[place] Workers (NAIW): If you disagree with a  decision, you may request that NAIW represent you without charge at an  Hearing. For information regarding denial of benefits, you may contact the [facility] at: [address], [address], [place] [address], [phone], or [address], [address], [place] [address], [phone]    To File a Complaint with the Division: If you wish to file a complaint with the  of the Division of Industrial Relations (DIR),  please contact the Workers’ Compensation Section, [address], [facility] [address], [place], [place] [address], telephone [phone], or [address] [facility], [facility] [address], [place], [place] [address],  telephone [phone].    For assistance with Workers’ Compensation Issues: You may contact the [facility] Office for Consumer Health Assistance, [facility] [facility], [facility] [address], [place] [address], Toll Free 1-990.732.2482, Web site: http://[facility].[place].gov/Programs/SANDER E-mail: [email or web address]  D-2 (rev. 10/20)              __________________________________________________________________                                    _03/10/2022_            Employee Name / Signature                                                                                                                            Date

## 2022-03-10 NOTE — ED TRIAGE NOTES
"Chief Complaint   Patient presents with   • Body Fluid Exposure     Pt had needle stick upstairs.        Pt to triage via steady gait for above complaint.     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    Pt back to lobby, educated on triage process and encourage to alert staff of any changes.     /89   Pulse 90   Temp 36.4 °C (97.5 °F) (Temporal)   Resp 16   Ht 1.753 m (5' 9\")   Wt 81.6 kg (180 lb)   SpO2 97%   BMI 26.58 kg/m²     "

## 2022-03-10 NOTE — LETTER
"  FORM C-4:  EMPLOYEE’S CLAIM FOR COMPENSATION/ REPORT OF INITIAL TREATMENT  EMPLOYEE’S CLAIM - PROVIDE ALL INFORMATION REQUESTED   First Name Nadya Last Name Swati Birthdate 1995  Sex female Claim Number   Home Address [address]   [place]             [place] [address]                                   Age  26 y.o. 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 (if applicable) ATTEMPTING TO INSERT IV    How did this injury or occupational disease occur? Be specific and answer in detail. 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 N/A Witnesses to the Accident  Liz Valente   Nature of Injury or Occupational Disease  Workers' Compensation Part(s) of Body Injured or Affected  Finger (L), N/A, N/A    I CERTIFY THAT THE ABOVE IS TRUE AND CORRECT TO THE BEST OF MY KNOWLEDGE AND THAT I HAVE PROVIDED THIS INFORMATION IN ORDER TO OBTAIN THE " [place]EF[place] 616[place], [place] CHAPTER 617 OF [place]).  I HEREBY AUTHORIZE ANY PHYSICIAN, CHIROPRACTOR, SURGEON, PRACTITIONER, OR OTHER PERSON, ANY HOSPITAL, INCLUDING [facility] OR [facility], ANY MEDICAL SERVICE ORGANIZATION, ANY INSURANCE COMPANY, OR OTHER INSTITUTION OR ORGANIZATION TO RELEASE TO EACH OTHER, ANY MEDICAL OR OTHER INFORMATION, INCLUDING BENEFITS PAID OR PAYABLE, PERTINENT TO THIS INJURY OR DISEASE, EXCEPT INFORMATION RELATIVE TO DIAGNOSIS, TREATMENT AND/OR COUNSELING FOR AIDS, PSYCHOLOGICAL CONDITIONS, ALCOHOL OR CONTROLLED SUBSTANCES, FOR WHICH I MUST GIVE SPECIFIC AUTHORIZATION.  A PHOTOSTAT OF THIS AUTHORIZATION SHALL BE AS VALID AS THE ORIGINAL.  Date  03/10/2022     Place  [facility]      Employee’s Signature   THIS REPORT MUST BE COMPLETED AND MAILED WITHIN 3 WORKING DAYS OF TREATMENT   Place [facility], EMERGENCY DEPT                       Name of Facility [facility]   Date  3/10/2022 Diagnosis  (W46.1XXA) Needlestick injury accident with exposure to body fluid, Acute  (S61.231A) Puncture wound of left index finger, Acute Is there evidence the injured employee was under the influence of alcohol and/or another controlled substance at the time of accident?   Hour  3:43 AM Description of Injury or Disease  Needlestick injury accident with exposure to body fluid  Puncture wound of left index finger No   Treatment  Puncture wound to left index finger due to accidental needlestick  Have you advised the patient to remain off work five days or more?         No   X-Ray Findings    If Yes   From Date    To Date      From information given by the employee, together with medical evidence, can you directly connect this injury or occupational disease as job incurred? Yes If No, is employee capable of: Full Duty  Yes Modified Duty      Is additional  "medical care by a physician indicated? Yes  Comments:Please follow-up with occupational health If Modified Duty, Specify any Limitations / Restrictions       Do you know of any previous injury or disease contributing to this condition or occupational disease? No    Date 3/10/2022 Print Doctor’s Name Mey Abernathy certify the employer’s copy of this form was mailed on:   Address [address] [place] [address]  [place] [id number] [phone] INSURER’S USE ONLY   Provider’s Tax ID Number   Telephone Dept: [phone]    Doctor’s Signature e-MEY Negrete D.O. Degree  M.D.      Form C-4 (rev.10/07)                                                                         BRIEF DESCRIPTION OF RIGHTS AND BENEFITS  ([place]ursuant to [place] 616C.050)    Notice of Injury or Occupational Disease (Incident Report Form C-1): If an injury or occupational disease (OD) arises out of and in the course of employment, you must provide written notice to your employer as soon as practicable, but no later than 7 days after the accident or OD. Your employer shall maintain a sufficient supply of the required forms.    Claim for Compensation (Form C-4): If medical treatment is sought, the form C-4 is available at the place of initial treatment. A completed \"Claim for Compensation\" (Form C-4) must be filed within 90 days after an accident or OD. The treating physician or chiropractor must, within 3 working days after treatment, complete and mail to the employer, the employer's insurer and third-party , the Claim for Compensation.    Medical Treatment: If you require medical treatment for your on-the-job injury or OD, you may be required to select a physician or chiropractor from a list provided by your workers’ compensation insurer, if it has contracted with an Organization for Managed Care (MCO) or Preferred Provider Organization (PPO) or providers of health care. If your employer has not entered into a contract with an MCO or PPO, you may " select a physician or chiropractor from the Panel of Physicians and Chiropractors. Any medical costs related to your industrial injury or OD will be paid by your insurer.    Temporary Total Disability (TTD): If your doctor has certified that you are unable to work for a period of at least 5 consecutive days, or 5 cumulative days in a 20-day period, or places restrictions on you that your employer does not accommodate, you may be entitled to TTD compensation.    Temporary Partial Disability (TPD): If the wage you receive upon reemployment is less than the compensation for TTD to which you are entitled, the insurer may be required to pay you TPD compensation to make up the difference. TPD can only be paid for a maximum of 24 months.    Permanent Partial Disability (PPD): When your medical condition is stable and there is an indication of a PPD as a result of your injury or OD, within 30 days, your insurer must arrange for an evaluation by a rating physician or chiropractor to determine the degree of your PPD. The amount of your PPD award depends on the date of injury, the results of the PPD evaluation, your age and wage.    Permanent Total Disability (PTD): If you are medically certified by a treating physician or chiropractor as permanently and totally disabled and have been granted a PTD status by your insurer, you are entitled to receive monthly benefits not to exceed 66 2/3% of your average monthly wage. The amount of your PTD payments is subject to reduction if you previously received a lump-sum PPD award.    Vocational Rehabilitation Services: You may be eligible for vocational rehabilitation services if you are unable to return to the job due to a permanent physical impairment or permanent restrictions as a result of your injury or occupational disease.    Transportation and Per Kristofer Reimbursement: You may be eligible for travel expenses and per kristofre associated with medical treatment.    Reopening: You may be  able to reopen your claim if your condition worsens after claim closure.     Appeal Process: If you disagree with a written determination issued by the insurer or the insurer does not respond to your request, you may appeal to the Department of Administration, , by following the instructions contained in your determination letter. You must appeal the determination within 70 days from the date of the determination letter at [address], [address], [place], [place] [address], or [address] [facility], [place], [place] [address]. If you disagree with the  decision, you may appeal to the Department of Administration, . You must file your appeal within 30 days from the date of the  decision letter at [address], [address], [place], [place] [address], or [address], [place], [place] [address]. If you disagree with a decision of an , you may file a petition for judicial review with the District Court. You must do so within 30 days of the Appeal Officer’s decision. You may be represented by an  at your own expense or you may contact the [facility] for possible representation.    [place]  for [place] ([place]): If you disagree with a  decision, you may request that NAIW represent you without charge at an  Hearing. For information regarding denial of benefits, you may contact the [facility] at: [address], [address], [place] [address], [phone], or [address], [place] [address], [phone]    To File a Complaint with the Division: If you wish to file a complaint with the  of the Division of Industrial Relations (DIR),  please contact the Workers’ Compensation Section, [address] [facility], [facility] [address], [place], [place] [address], telephone [phone], or [address] [facility] [address], [place], [place]  [address], telephone [phone].    For assistance with Workers’ Compensation Issues: You may contact the [facility] Office for Consumer Health Assistance, [facility] [facility], [facility] [address], [place] [address], Toll Free 1-170.844.3925, Web site: http://[facility].nv.gov/Programs/SANDER E-mail: [email or web address].[email or web address].gov  D-2 (rev. 10/20)              __________________________________________________________________                                    _03/10/2022            Employee Name / Signature                                                                                                                            Date

## 2022-03-11 NOTE — PROGRESS NOTES
"Subjective:     Nadya Longoria is a 26 y.o. female who presents for Other (WC DOI 3/10/22 needlestick, room 17)      DOI 3/10/2022: 26-year-old injured worker presents with needlestick injury.  JHONATAN: She was attempting to insert an IV, pulled the catheter needle out and accidentally poked herself in the left index finger.  She was seen in the ER, baseline labs were drawn and HIV medications were started.  Currently asymptomatic.    ROS: All systems were reviewed on intake form, form was reviewed and signed. See scanned documents in media. Pertinent positives and negatives included in HPI.    PMH: No pertinent past medical history to this problem  MEDS: Medications were reviewed in Epic  ALLERGIES: No Known Allergies  SOCHX: Works as an RN at [employer]  FH: No pertinent family history to this problem       Objective:     /66   Pulse 80   Temp 37 °C (98.6 °F)   Ht 1.753 m (5' 9\")   Wt 81.6 kg (180 lb)   SpO2 98%   BMI 26.58 kg/m²       Constitutional: Patient is in no acute distress. Appears well-developed and well-nourished.   HENT: Normocephalic and atraumatic. EOM are normal. No scleral icterus.   Cardiovascular: Normal rate.    Pulmonary/Chest: Effort normal. No respiratory distress.   Neurological: Patient is alert and oriented to person, place, and time.   Skin: Skin is warm and dry.   Psychiatric: Normal mood and affect. Behavior is normal.       Assessment/Plan:       1. Exposure to blood or body fluid  - HEPATITIS B VACCINE ADULT IM  - Consent or Declination Hep B    Released to Full Duty FROM 3/10/2022 TO       Source status negative for HIV, Hep B and Hep C  Baseline labs within normal limits. Negative for HIV, Hep B and Hep C. Titers indicate patient non-immune to Hepatitis B.  Recommend hepatitis B vaccine series provided first vaccine today.  Given nega  tive source no further follow up or testing recommended  Tetanus is up-to-date  Release from care  Follow up as needed      Differential " diagnosis, natural history, supportive care, and indications for immediate follow-up discussed.    Approximately 25 minutes were spent in reviewing notes, preparing for visit, obtaining history, exam and evaluation, patient counseling/education and post visit documentation/orders.

## 2022-04-15 ENCOUNTER — EH NON-PROVIDER (OUTPATIENT)
Dept: OCCUPATIONAL MEDICINE | Facility: CLINIC | Age: 27
End: 2022-04-15
Payer: COMMERCIAL

## 2022-04-15 DIAGNOSIS — Z23 NEED FOR HEPATITIS B BOOSTER VACCINATION: Primary | ICD-10-CM

## 2022-04-15 PROCEDURE — 90746 HEPB VACCINE 3 DOSE ADULT IM: CPT | Performed by: NURSE PRACTITIONER

## 2022-10-10 ENCOUNTER — IMMUNIZATION (OUTPATIENT)
Dept: OCCUPATIONAL MEDICINE | Facility: CLINIC | Age: 27
End: 2022-10-10
Payer: COMMERCIAL

## 2022-10-10 DIAGNOSIS — Z23 NEED FOR VACCINATION: Primary | ICD-10-CM

## 2022-10-10 PROCEDURE — 90686 IIV4 VACC NO PRSV 0.5 ML IM: CPT | Performed by: PREVENTIVE MEDICINE

## 2022-11-21 ENCOUNTER — EH NON-PROVIDER (OUTPATIENT)
Dept: OCCUPATIONAL MEDICINE | Facility: CLINIC | Age: 27
End: 2022-11-21
Payer: COMMERCIAL

## 2022-11-21 DIAGNOSIS — Z02.89 ENCOUNTER FOR OCCUPATIONAL HEALTH ASSESSMENT: ICD-10-CM

## 2022-11-21 PROCEDURE — 94375 RESPIRATORY FLOW VOLUME LOOP: CPT | Performed by: PREVENTIVE MEDICINE

## 2023-02-24 ENCOUNTER — PHARMACY VISIT (OUTPATIENT)
Dept: PHARMACY | Facility: MEDICAL CENTER | Age: 28
End: 2023-02-24
Payer: COMMERCIAL

## 2023-02-24 PROCEDURE — RXMED WILLOW AMBULATORY MEDICATION CHARGE: Performed by: INTERNAL MEDICINE

## 2024-04-15 ENCOUNTER — APPOINTMENT (OUTPATIENT)
Dept: OBGYN | Facility: CLINIC | Age: 29
End: 2024-04-15
Payer: COMMERCIAL

## 2025-04-07 ENCOUNTER — EH NON-PROVIDER (OUTPATIENT)
Dept: OCCUPATIONAL MEDICINE | Facility: CLINIC | Age: 30
End: 2025-04-07

## 2025-04-07 DIAGNOSIS — Z02.89 ENCOUNTER FOR OCCUPATIONAL HEALTH EXAMINATION INVOLVING RESPIRATOR: ICD-10-CM

## 2025-04-07 PROCEDURE — 94375 RESPIRATORY FLOW VOLUME LOOP: CPT | Performed by: NURSE PRACTITIONER
